# Patient Record
Sex: MALE | URBAN - METROPOLITAN AREA
[De-identification: names, ages, dates, MRNs, and addresses within clinical notes are randomized per-mention and may not be internally consistent; named-entity substitution may affect disease eponyms.]

---

## 2018-04-18 ENCOUNTER — APPOINTMENT (RX ONLY)
Dept: URBAN - METROPOLITAN AREA CLINIC 80 | Facility: CLINIC | Age: 83
Setting detail: DERMATOLOGY
End: 2018-04-18

## 2018-04-18 DIAGNOSIS — L81.4 OTHER MELANIN HYPERPIGMENTATION: ICD-10-CM

## 2018-04-18 DIAGNOSIS — D18.0 HEMANGIOMA: ICD-10-CM

## 2018-04-18 DIAGNOSIS — L82.1 OTHER SEBORRHEIC KERATOSIS: ICD-10-CM

## 2018-04-18 DIAGNOSIS — L57.0 ACTINIC KERATOSIS: ICD-10-CM

## 2018-04-18 DIAGNOSIS — Z85.828 PERSONAL HISTORY OF OTHER MALIGNANT NEOPLASM OF SKIN: ICD-10-CM

## 2018-04-18 PROBLEM — C18.9 MALIGNANT NEOPLASM OF COLON, UNSPECIFIED: Status: ACTIVE | Noted: 2018-04-18

## 2018-04-18 PROBLEM — D18.01 HEMANGIOMA OF SKIN AND SUBCUTANEOUS TISSUE: Status: ACTIVE | Noted: 2018-04-18

## 2018-04-18 PROBLEM — D48.5 NEOPLASM OF UNCERTAIN BEHAVIOR OF SKIN: Status: ACTIVE | Noted: 2018-04-18

## 2018-04-18 PROBLEM — D22.5 MELANOCYTIC NEVI OF TRUNK: Status: ACTIVE | Noted: 2018-04-18

## 2018-04-18 PROBLEM — J30.1 ALLERGIC RHINITIS DUE TO POLLEN: Status: ACTIVE | Noted: 2018-04-18

## 2018-04-18 PROBLEM — J44.9 CHRONIC OBSTRUCTIVE PULMONARY DISEASE, UNSPECIFIED: Status: ACTIVE | Noted: 2018-04-18

## 2018-04-18 PROCEDURE — 99203 OFFICE O/P NEW LOW 30 MIN: CPT | Mod: 25

## 2018-04-18 PROCEDURE — 17000 DESTRUCT PREMALG LESION: CPT

## 2018-04-18 PROCEDURE — ? SUNSCREEN RECOMMENDATIONS

## 2018-04-18 PROCEDURE — ? COUNSELING

## 2018-04-18 PROCEDURE — ? DEFER

## 2018-04-18 PROCEDURE — 17003 DESTRUCT PREMALG LES 2-14: CPT

## 2018-04-18 PROCEDURE — ? LIQUID NITROGEN

## 2018-04-18 ASSESSMENT — LOCATION SIMPLE DESCRIPTION DERM
LOCATION SIMPLE: CHEST
LOCATION SIMPLE: UPPER BACK
LOCATION SIMPLE: LEFT FOREHEAD
LOCATION SIMPLE: RIGHT SCALP
LOCATION SIMPLE: RIGHT CLAVICULAR SKIN
LOCATION SIMPLE: RIGHT ANTERIOR NECK
LOCATION SIMPLE: LEFT CLAVICULAR SKIN
LOCATION SIMPLE: ABDOMEN
LOCATION SIMPLE: RIGHT CHEEK
LOCATION SIMPLE: RIGHT UPPER ARM
LOCATION SIMPLE: LEFT EAR
LOCATION SIMPLE: SCALP
LOCATION SIMPLE: RIGHT FOREHEAD
LOCATION SIMPLE: LEFT FOREARM
LOCATION SIMPLE: RIGHT UPPER BACK

## 2018-04-18 ASSESSMENT — LOCATION DETAILED DESCRIPTION DERM
LOCATION DETAILED: RIGHT MEDIAL FRONTAL SCALP
LOCATION DETAILED: LEFT VENTRAL DISTAL FOREARM
LOCATION DETAILED: RIGHT SUPERIOR UPPER BACK
LOCATION DETAILED: LEFT INFERIOR FOREHEAD
LOCATION DETAILED: RIGHT SUPERIOR FOREHEAD
LOCATION DETAILED: PERIUMBILICAL SKIN
LOCATION DETAILED: SUPERIOR THORACIC SPINE
LOCATION DETAILED: RIGHT SUPERIOR FRONTAL SCALP
LOCATION DETAILED: RIGHT MEDIAL INFERIOR CHEST
LOCATION DETAILED: RIGHT ANTERIOR DISTAL UPPER ARM
LOCATION DETAILED: INFERIOR THORACIC SPINE
LOCATION DETAILED: RIGHT INFERIOR PREAURICULAR CHEEK
LOCATION DETAILED: RIGHT CLAVICULAR SKIN
LOCATION DETAILED: LEFT MEDIAL INFERIOR CHEST
LOCATION DETAILED: RIGHT CLAVICULAR NECK
LOCATION DETAILED: LEFT CLAVICULAR SKIN
LOCATION DETAILED: LEFT SUPERIOR HELIX

## 2018-04-18 ASSESSMENT — LOCATION ZONE DERM
LOCATION ZONE: NECK
LOCATION ZONE: TRUNK
LOCATION ZONE: FACE
LOCATION ZONE: ARM
LOCATION ZONE: SCALP
LOCATION ZONE: EAR

## 2018-04-18 NOTE — PROCEDURE: DEFER
Detail Level: Detailed
Scheduling Instructions (Optional): schedule for 2 week follow up for biopsy.
Scheduling Instructions (Optional): schedule 2 week follow up appointment for biopsy.

## 2018-05-02 ENCOUNTER — APPOINTMENT (RX ONLY)
Dept: URBAN - METROPOLITAN AREA CLINIC 80 | Facility: CLINIC | Age: 83
Setting detail: DERMATOLOGY
End: 2018-05-02

## 2018-05-02 DIAGNOSIS — L57.0 ACTINIC KERATOSIS: ICD-10-CM

## 2018-05-02 PROBLEM — D48.5 NEOPLASM OF UNCERTAIN BEHAVIOR OF SKIN: Status: ACTIVE | Noted: 2018-05-02

## 2018-05-02 PROCEDURE — ? BIOPSY BY SHAVE METHOD

## 2018-05-02 PROCEDURE — 11100: CPT | Mod: 59

## 2018-05-02 PROCEDURE — ? ADDITIONAL NOTES

## 2018-05-02 PROCEDURE — 17000 DESTRUCT PREMALG LESION: CPT

## 2018-05-02 PROCEDURE — ? LIQUID NITROGEN

## 2018-05-02 PROCEDURE — ? COUNSELING

## 2018-05-02 PROCEDURE — 99213 OFFICE O/P EST LOW 20 MIN: CPT | Mod: 25

## 2018-05-02 PROCEDURE — 17003 DESTRUCT PREMALG LES 2-14: CPT

## 2018-05-02 ASSESSMENT — LOCATION DETAILED DESCRIPTION DERM
LOCATION DETAILED: RIGHT SUPERIOR FOREHEAD
LOCATION DETAILED: LEFT FOREHEAD

## 2018-05-02 ASSESSMENT — LOCATION SIMPLE DESCRIPTION DERM
LOCATION SIMPLE: RIGHT FOREHEAD
LOCATION SIMPLE: LEFT FOREHEAD

## 2018-05-02 ASSESSMENT — LOCATION ZONE DERM: LOCATION ZONE: FACE

## 2018-05-02 NOTE — PROCEDURE: ADDITIONAL NOTES
Additional Notes: Per patient, this lesion was biopsied at [facility].  Will request notes and Pathology. Patient gives history today that he has declined radiation therapy at this site but is interested in our recommendation for treatment. Additional Notes: Per patient, this lesion was biopsied at [facility].  Will request notes and Pathology. Patient gives history today that he has declined radiation therapy at this site but is interested in our recommendation for treatment.

## 2018-05-16 ENCOUNTER — APPOINTMENT (RX ONLY)
Dept: URBAN - METROPOLITAN AREA CLINIC 80 | Facility: CLINIC | Age: 83
Setting detail: DERMATOLOGY
End: 2018-05-16

## 2018-05-16 DIAGNOSIS — L72.8 OTHER FOLLICULAR CYSTS OF THE SKIN AND SUBCUTANEOUS TISSUE: ICD-10-CM

## 2018-05-16 PROBLEM — D48.5 NEOPLASM OF UNCERTAIN BEHAVIOR OF SKIN: Status: ACTIVE | Noted: 2018-05-16

## 2018-05-16 PROBLEM — D04.62 CARCINOMA IN SITU OF SKIN OF LEFT UPPER LIMB, INCLUDING SHOULDER: Status: ACTIVE | Noted: 2018-05-16

## 2018-05-16 PROCEDURE — ? COUNSELING

## 2018-05-16 PROCEDURE — 99213 OFFICE O/P EST LOW 20 MIN: CPT

## 2018-05-16 PROCEDURE — ? ADDITIONAL NOTES

## 2018-05-16 PROCEDURE — ? PATHOLOGY DISCUSSION

## 2018-05-16 PROCEDURE — ? DEFER

## 2018-05-16 ASSESSMENT — LOCATION DETAILED DESCRIPTION DERM: LOCATION DETAILED: RIGHT POSTERIOR SHOULDER

## 2018-05-16 ASSESSMENT — LOCATION ZONE DERM: LOCATION ZONE: ARM

## 2018-05-16 ASSESSMENT — LOCATION SIMPLE DESCRIPTION DERM: LOCATION SIMPLE: RIGHT SHOULDER

## 2018-05-16 NOTE — PROCEDURE: ADDITIONAL NOTES
Additional Notes: Per patient, positive for Basal Cell Carcinoma, Waiting on pathology report from assisted living.  Will plan MOHS if positive
Additional Notes: Option to Drain discussed, quoted $150

## 2018-05-24 ENCOUNTER — APPOINTMENT (RX ONLY)
Dept: URBAN - METROPOLITAN AREA CLINIC 80 | Facility: CLINIC | Age: 83
Setting detail: DERMATOLOGY
End: 2018-05-24

## 2018-05-24 DIAGNOSIS — H61.03 CHONDRITIS OF EXTERNAL EAR: ICD-10-CM

## 2018-05-24 PROBLEM — D04.62 CARCINOMA IN SITU OF SKIN OF LEFT UPPER LIMB, INCLUDING SHOULDER: Status: ACTIVE | Noted: 2018-05-24

## 2018-05-24 PROBLEM — H61.031 CHONDRITIS OF RIGHT EXTERNAL EAR: Status: ACTIVE | Noted: 2018-05-24

## 2018-05-24 PROBLEM — C44.222 SQUAMOUS CELL CARCINOMA OF SKIN OF RIGHT EAR AND EXTERNAL AURICULAR CANAL: Status: ACTIVE | Noted: 2018-05-24

## 2018-05-24 PROCEDURE — ? COUNSELING

## 2018-05-24 PROCEDURE — ? PATHOLOGY DISCUSSION

## 2018-05-24 PROCEDURE — ? DEFER

## 2018-05-24 PROCEDURE — ? CURETTAGE AND DESTRUCTION WITH PATHOLOGY

## 2018-05-24 PROCEDURE — 17262 DSTRJ MAL LES T/A/L 1.1-2.0: CPT

## 2018-05-24 PROCEDURE — 99213 OFFICE O/P EST LOW 20 MIN: CPT | Mod: 25

## 2018-05-24 ASSESSMENT — LOCATION SIMPLE DESCRIPTION DERM: LOCATION SIMPLE: RIGHT EAR

## 2018-05-24 ASSESSMENT — LOCATION DETAILED DESCRIPTION DERM: LOCATION DETAILED: RIGHT SUPERIOR POSTERIOR HELIX

## 2018-05-24 ASSESSMENT — LOCATION ZONE DERM: LOCATION ZONE: EAR

## 2018-05-24 NOTE — PROCEDURE: CURETTAGE AND DESTRUCTION WITH PATHOLOGY
Render Post-Care Instructions In Note?: no
Number Of Curettages: 3
Biopsy Type: H and E
Additional Information: (Optional): The wound was cleaned, and a pressure dressing was applied.  The patient received detailed post-op instructions.
Consent was obtained from the patient. The risks, benefits and alternatives to therapy were discussed in detail. Specifically, the risks of infection, scarring, bleeding, prolonged wound healing, nerve injury, incomplete removal, allergy to anesthesia and recurrence were addressed. Alternatives to ED&C, such as: surgical removal and XRT were also discussed.  Prior to the procedure, the treatment site was clearly identified and confirmed by the patient. All components of Universal Protocol/PAUSE Rule completed.
Post-Care Instructions: I reviewed with the patient in detail post-care instructions. Patient is to keep the area dry for 48 hours, and not to engage in any swimming until the area is healed. Should the patient develop any fevers, chills, bleeding, severe pain patient will contact the office immediately.
Billing Type: Third-Party Bill
Histology Text: Following the procedure a portion of the curetted material was sent for histologic evaluation.
Bill As A Line Item Or As Units: Line Item
Detail Level: Detailed
Anesthesia Type: 1% lidocaine with epinephrine
Size Of Lesion In Cm: 1
What Was Performed First?: Curettage
Size Of Lesion After Curettage: 1.2
Cautery Type: electrodesiccation

## 2018-06-08 ENCOUNTER — APPOINTMENT (RX ONLY)
Dept: URBAN - METROPOLITAN AREA CLINIC 79 | Facility: CLINIC | Age: 83
Setting detail: DERMATOLOGY
End: 2018-06-08

## 2018-06-08 VITALS — SYSTOLIC BLOOD PRESSURE: 188 MMHG | HEART RATE: 70 BPM | DIASTOLIC BLOOD PRESSURE: 88 MMHG

## 2018-06-08 PROBLEM — C44.222 SQUAMOUS CELL CARCINOMA OF SKIN OF RIGHT EAR AND EXTERNAL AURICULAR CANAL: Status: ACTIVE | Noted: 2018-06-08

## 2018-06-08 PROCEDURE — ? ADDITIONAL PATHOLOGY SPECIMEN

## 2018-06-08 PROCEDURE — ? MOHS SURGERY

## 2018-06-08 PROCEDURE — ? REPAIR NOTE

## 2018-06-08 PROCEDURE — 17311 MOHS 1 STAGE H/N/HF/G: CPT

## 2018-06-08 PROCEDURE — ? PRESCRIPTION

## 2018-06-08 PROCEDURE — 17312 MOHS ADDL STAGE: CPT

## 2018-06-08 PROCEDURE — ? ADDITIONAL NOTES

## 2018-06-08 RX ORDER — CLINDAMYCIN HYDROCHLORIDE 300 MG/1
CAPSULE ORAL
Qty: 14 | Refills: 0 | Status: ERX | COMMUNITY
Start: 2018-06-08

## 2018-06-08 RX ORDER — TRAMADOL HYDROCHLORIDE 50 MG/1
TABLET, FILM COATED ORAL
Qty: 10 | Refills: 0 | COMMUNITY
Start: 2018-06-08

## 2018-06-08 RX ADMIN — TRAMADOL HYDROCHLORIDE: 50 TABLET, FILM COATED ORAL at 22:00

## 2018-06-08 RX ADMIN — CLINDAMYCIN HYDROCHLORIDE: 300 CAPSULE ORAL at 22:10

## 2018-06-08 NOTE — PROCEDURE: ADDITIONAL NOTES
Additional Notes: perineural invasion seen on MOHS layer stage 1, slide # [id number] : slide is marked with sharpie in location of suspected PNI, MOHS slides available upon request. if needed. PLEASE CONFIRM PNI & ADD SIZE OF NERVE INVOLVEMENT\\n\\n\\n\\nPt left & went home after 3rd layer against medical advice. Pt called complaining of wound bleeding. Instructed patient to come back for re-bandage and cautery.\\nPt will be back Tuesday 6.12.2018 @9am for a delayed closure, NAYELI Delatorre Additional Notes: perineural invasion seen on MOHS layer stage 1, slide # [id number] : slide is marked with sharpie in location of suspected PNI, MOHS slides available upon request. if needed. PLEASE CONFIRM PNI & ADD SIZE OF NERVE INVOLVEMENT\\n\\n\\n\\nPt left & went home after 3rd layer against medical advice. Pt called complaining of wound bleeding. Instructed patient to come back for re-bandage and cautery.\\nPt will be back Tuesday 6.12.2018 @9am for a delayed closure, NAYELI Delatorre

## 2018-06-08 NOTE — PROCEDURE: ADDITIONAL PATHOLOGY SPECIMEN
Path Notes (To The Dermatopathologist): perineural invasion seen on MOHS layer stage 1, slide # [id number] : slide is marked with sharpie in location of suspected PNI, MOHS slides available upon request. if needed. PLEASE CONFIRM PNI & ADD SIZE OF NERVE INVOLVEMENT Path Notes (To The Dermatopathologist): perineural invasion seen on MOHS layer stage 1, slide # [id number] : slide is marked with sharpie in location of suspected PNI, MOHS slides available upon request. if needed. PLEASE CONFIRM PNI & ADD SIZE OF NERVE INVOLVEMENT

## 2018-06-08 NOTE — PROCEDURE: MOHS SURGERY
Body Location Override (Optional - Billing Will Still Be Based On Selected Body Map Location If Applicable): Rt helix

## 2018-06-12 ENCOUNTER — APPOINTMENT (RX ONLY)
Dept: URBAN - METROPOLITAN AREA CLINIC 79 | Facility: CLINIC | Age: 83
Setting detail: DERMATOLOGY
End: 2018-06-12

## 2018-06-12 PROBLEM — C44.222 SQUAMOUS CELL CARCINOMA OF SKIN OF RIGHT EAR AND EXTERNAL AURICULAR CANAL: Status: ACTIVE | Noted: 2018-06-12

## 2018-06-12 PROCEDURE — ? PRESCRIPTION

## 2018-06-12 PROCEDURE — ? REPAIR NOTE

## 2018-06-12 RX ORDER — DOXYCYCLINE 100 MG/1
CAPSULE ORAL
Qty: 10 | Refills: 0 | Status: ERX | COMMUNITY
Start: 2018-06-12

## 2018-06-12 RX ADMIN — DOXYCYCLINE: 100 CAPSULE ORAL at 14:58

## 2018-06-12 NOTE — PROCEDURE: REPAIR NOTE
Body Location Override (Optional - Billing Will Still Be Based On Selected Body Map Location If Applicable): Right Inferior Posterior Helix

## 2018-06-12 NOTE — HPI: DELAYED REPAIR FOLLOWING MOHS SURGERY
When Was Your Cancer Treated With Mohs Surgery?: three days ago
Body Location Override (Optional): Right Inferior Posterior Helix

## 2018-07-10 ENCOUNTER — APPOINTMENT (RX ONLY)
Dept: URBAN - METROPOLITAN AREA CLINIC 79 | Facility: CLINIC | Age: 83
Setting detail: DERMATOLOGY
End: 2018-07-10

## 2018-07-10 DIAGNOSIS — Z48.817 ENCOUNTER FOR SURGICAL AFTERCARE FOLLOWING SURGERY ON THE SKIN AND SUBCUTANEOUS TISSUE: ICD-10-CM

## 2018-07-10 PROCEDURE — 99024 POSTOP FOLLOW-UP VISIT: CPT

## 2018-07-10 PROCEDURE — ? POST-OP WOUND CHECK

## 2018-07-10 ASSESSMENT — LOCATION ZONE DERM: LOCATION ZONE: EAR

## 2018-07-10 ASSESSMENT — LOCATION SIMPLE DESCRIPTION DERM: LOCATION SIMPLE: RIGHT EAR

## 2018-07-10 ASSESSMENT — LOCATION DETAILED DESCRIPTION DERM: LOCATION DETAILED: RIGHT POSTERIOR EAR

## 2018-07-10 NOTE — PROCEDURE: POST-OP WOUND CHECK
Detail Level: Simple
Add 46526 Cpt? (Important Note: In 2017 The Use Of 36242 Is Being Tracked By Cms To Determine Future Global Period Reimbursement For Global Periods): yes

## 2018-09-02 NOTE — PROCEDURE: REPAIR NOTE
no discharge, no irritation, no pain, no redness, and no visual changes. Advancement Flap (Double) Text: The defect edges were debeveled with a #15 scalpel blade.  Given the location of the defect and the proximity to free margins a double advancement flap was deemed most appropriate.  Using a sterile surgical marker, the appropriate advancement flaps were drawn incorporating the defect and placing the expected incisions within the relaxed skin tension lines where possible.    The area thus outlined was incised deep to adipose tissue with a #15 scalpel blade.  The skin margins were undermined to an appropriate distance in all directions utilizing iris scissors.

## 2020-09-14 NOTE — PROCEDURE: MOHS SURGERY
none Consent (Lip)/Introductory Paragraph: The rationale for Mohs was explained to the patient and consent was obtained. The risks, benefits and alternatives to therapy were discussed in detail. Specifically, the risks of lip deformity, changes in the oral aperture, infection, scarring, bleeding, prolonged wound healing, incomplete removal, allergy to anesthesia, nerve injury and recurrence were addressed. Prior to the procedure, the treatment site was clearly identified and confirmed by the patient. All components of Universal Protocol/PAUSE Rule completed.

## 2021-12-13 NOTE — PROCEDURE: REPAIR NOTE
Closure 3 Information: This tab is for additional flaps and grafts above and beyond our usual structured repairs.  Please note if you enter information here it will not currently bill and you will need to add the billing information manually. Doxycycline Counseling:  Patient counseled regarding possible photosensitivity and increased risk for sunburn.  Patient instructed to avoid sunlight, if possible.  When exposed to sunlight, patients should wear protective clothing, sunglasses, and sunscreen.  The patient was instructed to call the office immediately if the following severe adverse effects occur:  hearing changes, easy bruising/bleeding, severe headache, or vision changes.  The patient verbalized understanding of the proper use and possible adverse effects of doxycycline.  All of the patient's questions and concerns were addressed.

## 2022-11-05 NOTE — PROCEDURE: MOHS SURGERY
Concern for worsening infectious process Partial Purse String (Simple) Text: Given the location of the defect and the characteristics of the surrounding skin a simple purse string closure was deemed most appropriate.  Undermining was performed circumfirentially around the surgical defect.  A purse string suture was then placed and tightened. Wound tension only allowed a partial closure of the circular defect.
